# Patient Record
Sex: FEMALE
[De-identification: names, ages, dates, MRNs, and addresses within clinical notes are randomized per-mention and may not be internally consistent; named-entity substitution may affect disease eponyms.]

---

## 2019-07-10 PROBLEM — Z00.00 ENCOUNTER FOR PREVENTIVE HEALTH EXAMINATION: Status: ACTIVE | Noted: 2019-07-10

## 2019-07-19 ENCOUNTER — APPOINTMENT (OUTPATIENT)
Dept: ORTHOPEDIC SURGERY | Facility: CLINIC | Age: 69
End: 2019-07-19
Payer: COMMERCIAL

## 2019-07-19 VITALS
WEIGHT: 135 LBS | SYSTOLIC BLOOD PRESSURE: 130 MMHG | BODY MASS INDEX: 23.05 KG/M2 | HEIGHT: 64 IN | HEART RATE: 58 BPM | DIASTOLIC BLOOD PRESSURE: 81 MMHG

## 2019-07-19 DIAGNOSIS — Z87.39 PERSONAL HISTORY OF OTHER DISEASES OF THE MUSCULOSKELETAL SYSTEM AND CONNECTIVE TISSUE: ICD-10-CM

## 2019-07-19 DIAGNOSIS — Z78.9 OTHER SPECIFIED HEALTH STATUS: ICD-10-CM

## 2019-07-19 DIAGNOSIS — Z82.61 FAMILY HISTORY OF ARTHRITIS: ICD-10-CM

## 2019-07-19 DIAGNOSIS — M17.12 UNILATERAL PRIMARY OSTEOARTHRITIS, LEFT KNEE: ICD-10-CM

## 2019-07-19 PROCEDURE — 73562 X-RAY EXAM OF KNEE 3: CPT

## 2019-07-19 PROCEDURE — 99203 OFFICE O/P NEW LOW 30 MIN: CPT

## 2019-07-19 RX ORDER — ALENDRONATE SODIUM AND CHOLECALCIFEROL 70; 5600 MG/1; [IU]/1
TABLET ORAL
Refills: 0 | Status: ACTIVE | COMMUNITY

## 2019-07-19 NOTE — DISCUSSION/SUMMARY
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 She may be a candidate for left knee arthroscopy, but I advised against surgical treatment at this time given her arthritic change and her improving symptoms. She will continue with conservative treatment at this time. I encouraged her to continue with low impact exercises and strengthening. Activity modifications and restrictions were discussed. We discussed cortisone injections and I offered her injection but she defers. F/U PRN.

## 2019-07-19 NOTE — HISTORY OF PRESENT ILLNESS
[2] : a current pain level of 2/10 [Standing] : standing [Intermit.] : ~He/She~ states the symptoms seem to be intermittent [Bending] : worsened by bending [Walking] : worsened by walking [Recumbency] : relieved by recumbency [Rest] : relieved by rest [___ mths] : [unfilled] month(s) ago [Improving] : improving [de-identified] : 69 year old female presents for evaluation of left knee pain, rated 2/10 in severity and described as dull. Pain is intermittent. Pain has been present for about 1 month, and has been improving. She reports associated left knee swelling and stiffness.

## 2019-07-19 NOTE — ADDENDUM
[FreeTextEntry1] : I, Alexander Quiñones, acted solely as a scribe for Dr. Torey Suarez on this date 07/19/2019.

## 2019-07-19 NOTE — PHYSICAL EXAM
[LE] : Sensory: Intact in bilateral lower extremities [ALL] : dorsalis pedis, posterior tibial, femoral, popliteal, and radial 2+ and symmetric bilaterally [Normal] : Alert and in no acute distress [Antalgic] : not antalgic [Poor Appearance] : well-appearing [Acute Distress] : not in acute distress [Obese] : not obese [de-identified] : GENERAL APPEARANCE: Well nourished and hydrated, pleasant, alert, and oriented x 3. Appears their stated age. \par HEENT: Normocephalic, extraocular eye motion intact. Nasal septum midline. Oral cavity clear. External auditory canal clear. \par RESPIRATORY: Breath sounds clear and audible in all lobes. No wheezing, No accessory muscle use.\par CARDIOVASCULAR: No apparent abnormalities. No lower leg edema. No varicosities. Pedal pulses are palpable.\par NEUROLOGIC: Sensation is normal, no muscle weakness in the upper or lower extremities.\par DERMATOLOGIC: No apparent skin lesions, moist, warm, no rash.\par SPINE: Cervical spine appears normal and moves freely; thoracic spine appears normal and moves freely; lumbosacral spine appears normal and moves freely, normal, nontender.\par MUSCULOSKELETAL: Hands, wrists, and elbows are normal and move freely, shoulders are normal and move freely.  [de-identified] : Left knee exam shows medial joint line tenderness, slight effusion, neutral alignment, preserved ROM [de-identified] : 3V Xray of the left knee done in office today and reviewed by Dr. Torey Suarez demonstrates moderate to early advanced medial compartment OA. \par \par MRI of the left knee obtained 6/25/2019 at [facility] shows medial meniscal tear. Patellofemoral and medial compartment arthritis. Small knee joint effusion, small popliteal cyst, and mild pes anserine bursitis.\par \par